# Patient Record
Sex: MALE | Race: WHITE | Employment: FULL TIME | URBAN - METROPOLITAN AREA
[De-identification: names, ages, dates, MRNs, and addresses within clinical notes are randomized per-mention and may not be internally consistent; named-entity substitution may affect disease eponyms.]

---

## 2019-10-26 ENCOUNTER — HOSPITAL ENCOUNTER (EMERGENCY)
Age: 39
Discharge: HOME | End: 2019-10-26
Attending: EMERGENCY MEDICINE
Payer: COMMERCIAL

## 2019-10-26 VITALS
OXYGEN SATURATION: 98 % | DIASTOLIC BLOOD PRESSURE: 82 MMHG | RESPIRATION RATE: 14 BRPM | HEART RATE: 66 BPM | WEIGHT: 174.16 LBS | BODY MASS INDEX: 22.35 KG/M2 | SYSTOLIC BLOOD PRESSURE: 127 MMHG | TEMPERATURE: 98.1 F | HEIGHT: 74 IN

## 2019-10-26 DIAGNOSIS — J01.00 ACUTE NON-RECURRENT MAXILLARY SINUSITIS: Primary | ICD-10-CM

## 2019-10-26 PROCEDURE — 74011250637 HC RX REV CODE- 250/637: Performed by: EMERGENCY MEDICINE

## 2019-10-26 PROCEDURE — 99283 EMERGENCY DEPT VISIT LOW MDM: CPT

## 2019-10-26 RX ORDER — AMOXICILLIN AND CLAVULANATE POTASSIUM 875; 125 MG/1; MG/1
1 TABLET, FILM COATED ORAL
Status: COMPLETED | OUTPATIENT
Start: 2019-10-26 | End: 2019-10-26

## 2019-10-26 RX ORDER — AMOXICILLIN AND CLAVULANATE POTASSIUM 875; 125 MG/1; MG/1
1 TABLET, FILM COATED ORAL 2 TIMES DAILY
Qty: 19 TAB | Refills: 0 | Status: SHIPPED | OUTPATIENT
Start: 2019-10-26 | End: 2019-11-05

## 2019-10-26 RX ADMIN — AMOXICILLIN AND CLAVULANATE POTASSIUM 1 TABLET: 875; 125 TABLET, FILM COATED ORAL at 11:20

## 2019-10-26 NOTE — ED TRIAGE NOTES
Triage note: Pt states he has left eye pain that he has had for about two weeks, but that today the pain has increased. Denies decrease in vision. Also states he has had sinus congestion for about a week.

## 2019-10-26 NOTE — ED NOTES
Patient was discharged and given instructions by Dr. Peewee White. Patient verbalized good understanding of all discharge instructions, prescriptions and f/u care. All questions answered. Pt in stable condition on discharge.

## 2019-10-26 NOTE — ED PROVIDER NOTES
28-year-old male with recent sick contacts at home but no other significant past medical history, who wears glasses while driving to see far away but otherwise does not need glasses or contacts presents to the emergency department noting a 1 week history of progressively worsening nasal congestion and worsening pain just under and adjacent to his left eye. He notes some tenderness in his sinus cavity, scratchy throat, and ear congestion. He denies any noted fevers, cough, shortness of breath, vision changes, pain with extraocular movement, eye redness, or facial swelling. Denies any trauma to the area. He has not taken anything for his symptoms. Of note both his wife and his child are currently taking antibiotics for the infections currently. History reviewed. No pertinent past medical history. History reviewed. No pertinent surgical history. History reviewed. No pertinent family history.     Social History     Socioeconomic History    Marital status: Not on file     Spouse name: Not on file    Number of children: Not on file    Years of education: Not on file    Highest education level: Not on file   Occupational History    Not on file   Social Needs    Financial resource strain: Not on file    Food insecurity:     Worry: Not on file     Inability: Not on file    Transportation needs:     Medical: Not on file     Non-medical: Not on file   Tobacco Use    Smoking status: Never Smoker    Smokeless tobacco: Never Used   Substance and Sexual Activity    Alcohol use: Never     Frequency: Never    Drug use: Never    Sexual activity: Not on file   Lifestyle    Physical activity:     Days per week: Not on file     Minutes per session: Not on file    Stress: Not on file   Relationships    Social connections:     Talks on phone: Not on file     Gets together: Not on file     Attends Bahai service: Not on file     Active member of club or organization: Not on file     Attends meetings of clubs or organizations: Not on file     Relationship status: Not on file    Intimate partner violence:     Fear of current or ex partner: Not on file     Emotionally abused: Not on file     Physically abused: Not on file     Forced sexual activity: Not on file   Other Topics Concern    Not on file   Social History Narrative    Not on file         ALLERGIES: Patient has no known allergies. Review of Systems   Constitutional: Negative for activity change, appetite change, chills and fever. HENT: Positive for ear pain (Ear pressure), rhinorrhea, sinus pressure, sinus pain, sneezing and sore throat (Scratchy). Negative for congestion, ear discharge, facial swelling and voice change. Eyes: Positive for pain (Just behind and adjacent to left eye). Negative for photophobia, discharge, redness, itching and visual disturbance. Respiratory: Negative for cough and shortness of breath. Cardiovascular: Negative for chest pain. Gastrointestinal: Negative for abdominal pain, blood in stool, constipation, diarrhea, nausea and vomiting. Genitourinary: Negative for difficulty urinating, dysuria, flank pain, hematuria, penile pain and testicular pain. Musculoskeletal: Negative for arthralgias, back pain, myalgias and neck pain. Skin: Negative for rash and wound. Neurological: Negative for syncope, weakness, light-headedness, numbness and headaches. Psychiatric/Behavioral: Negative for self-injury and suicidal ideas. All other systems reviewed and are negative. Vitals:    10/26/19 1035   BP: 137/84   Pulse: 68   Resp: 14   Temp: 98.1 °F (36.7 °C)   SpO2: 97%   Weight: 79 kg (174 lb 2.6 oz)   Height: 6' 2\" (1.88 m)            Physical Exam   Constitutional: He is oriented to person, place, and time. He appears well-developed and well-nourished. No distress. HENT:   Head: Normocephalic and atraumatic.    Right Ear: Tympanic membrane and external ear normal.   Left Ear: Tympanic membrane and external ear normal.   Mildly erythematous posterior oropharynx without tonsillar exudates, asymmetric swelling, or trismus. Boggy, edematous nasal mucosa with tenderness to palpation more so over the left maxillary sinus. Eyes: Pupils are equal, round, and reactive to light. Conjunctivae and EOM are normal.   20/25 in each individually, and 20/20 in both. Normal conjunctiva. No pain with extraocular movements. No evidence of entrapment. No proptosis or ptosis or evidence of periorbital cellulitis. Neck: Normal range of motion. Neck supple. Cardiovascular: Normal rate, regular rhythm, normal heart sounds and intact distal pulses. Pulmonary/Chest: Effort normal and breath sounds normal.   Abdominal: Soft. He exhibits no distension. There is no tenderness. Musculoskeletal: He exhibits no edema or tenderness. Lymphadenopathy:     He has cervical adenopathy. Neurological: He is alert and oriented to person, place, and time. He has normal strength. No cranial nerve deficit or sensory deficit. Coordination normal. GCS eye subscore is 4. GCS verbal subscore is 5. GCS motor subscore is 6. Skin: Skin is warm and dry. He is not diaphoretic. Nursing note and vitals reviewed. MDM   40-year-old male presents with sinus tenderness and congestion, worse in the left maxillary sinus just under and adjacent to his left eye. Eye appears to be unaffected, with normal visual acuity and no evidence of orbital cellulitis, or more concerning deeper space infection. Patient was offered CT evaluation to confirm but given low suspicion for deep space infection patient declined at this time. Will prescribe a course of Augmentin which was started in the ED. He was recommended to follow-up with his ophthalmologist, as he has scheduled next week. Also recommended to follow-up with his primary care provider for further evaluation of his symptoms.   Strict return precautions were given for worsening or concerns. This plan was discussed with the patient at the bedside and he stated both understanding and agreement.     Procedures